# Patient Record
Sex: MALE | Race: BLACK OR AFRICAN AMERICAN | NOT HISPANIC OR LATINO | Employment: UNEMPLOYED | URBAN - METROPOLITAN AREA
[De-identification: names, ages, dates, MRNs, and addresses within clinical notes are randomized per-mention and may not be internally consistent; named-entity substitution may affect disease eponyms.]

---
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
## 2021-07-18 ENCOUNTER — HOSPITAL ENCOUNTER (EMERGENCY)
Facility: OTHER | Age: 21
Discharge: HOME | End: 2021-07-18
Attending: EMERGENCY MEDICINE

## 2021-07-18 VITALS
TEMPERATURE: 98 F | DIASTOLIC BLOOD PRESSURE: 76 MMHG | SYSTOLIC BLOOD PRESSURE: 124 MMHG | RESPIRATION RATE: 18 BRPM | HEIGHT: 72 IN | OXYGEN SATURATION: 99 % | BODY MASS INDEX: 20.32 KG/M2 | HEART RATE: 76 BPM | WEIGHT: 150 LBS

## 2021-07-18 DIAGNOSIS — U07.1 COVID-19 VIRUS INFECTION: Primary | ICD-10-CM

## 2021-07-18 LAB
CTP QC/QA: YES
SARS-COV-2 RDRP RESP QL NAA+PROBE: POSITIVE

## 2021-07-18 PROCEDURE — U0002 COVID-19 LAB TEST NON-CDC: HCPCS | Performed by: EMERGENCY MEDICINE

## 2021-07-18 PROCEDURE — 99284 EMERGENCY DEPT VISIT MOD MDM: CPT

## 2021-07-18 RX ORDER — BENZONATATE 100 MG/1
100 CAPSULE ORAL 3 TIMES DAILY PRN
Qty: 30 CAPSULE | Refills: 0 | Status: SHIPPED | OUTPATIENT
Start: 2021-07-18 | End: 2021-07-28

## 2021-07-18 RX ORDER — ONDANSETRON 4 MG/1
4 TABLET, ORALLY DISINTEGRATING ORAL EVERY 6 HOURS PRN
Qty: 20 TABLET | Refills: 0 | Status: SHIPPED | OUTPATIENT
Start: 2021-07-18 | End: 2021-12-28 | Stop reason: ALTCHOICE

## 2021-07-18 RX ORDER — DICYCLOMINE HYDROCHLORIDE 20 MG/1
20 TABLET ORAL 4 TIMES DAILY
Qty: 20 TABLET | Refills: 0 | Status: SHIPPED | OUTPATIENT
Start: 2021-07-18 | End: 2021-08-07

## 2021-07-19 DIAGNOSIS — U07.1 COVID-19 VIRUS DETECTED: ICD-10-CM

## 2021-07-22 ENCOUNTER — NURSE TRIAGE (OUTPATIENT)
Dept: ADMINISTRATIVE | Facility: CLINIC | Age: 21
End: 2021-07-22

## 2021-08-17 ENCOUNTER — HOSPITAL ENCOUNTER (EMERGENCY)
Facility: OTHER | Age: 21
Discharge: HOME | End: 2021-08-17
Attending: EMERGENCY MEDICINE

## 2021-08-17 VITALS
WEIGHT: 140 LBS | OXYGEN SATURATION: 98 % | DIASTOLIC BLOOD PRESSURE: 82 MMHG | HEART RATE: 71 BPM | RESPIRATION RATE: 16 BRPM | SYSTOLIC BLOOD PRESSURE: 125 MMHG | BODY MASS INDEX: 19.6 KG/M2 | TEMPERATURE: 98 F | HEIGHT: 71 IN

## 2021-08-17 DIAGNOSIS — Z20.822 ENCOUNTER FOR LABORATORY TESTING FOR COVID-19 VIRUS: Primary | ICD-10-CM

## 2021-08-17 LAB
CTP QC/QA: YES
SARS-COV-2 RDRP RESP QL NAA+PROBE: NEGATIVE

## 2021-08-17 PROCEDURE — U0002 COVID-19 LAB TEST NON-CDC: HCPCS | Performed by: EMERGENCY MEDICINE

## 2021-08-17 PROCEDURE — 99282 EMERGENCY DEPT VISIT SF MDM: CPT

## 2021-08-27 ENCOUNTER — HOSPITAL ENCOUNTER (EMERGENCY)
Facility: OTHER | Age: 21
Discharge: HOME | End: 2021-08-27
Attending: EMERGENCY MEDICINE

## 2021-08-27 VITALS
OXYGEN SATURATION: 96 % | DIASTOLIC BLOOD PRESSURE: 62 MMHG | RESPIRATION RATE: 14 BRPM | SYSTOLIC BLOOD PRESSURE: 107 MMHG | TEMPERATURE: 98 F | HEART RATE: 68 BPM

## 2021-08-27 DIAGNOSIS — R06.09 DOE (DYSPNEA ON EXERTION): Primary | ICD-10-CM

## 2021-08-27 DIAGNOSIS — R07.89 CHEST TIGHTNESS: ICD-10-CM

## 2021-08-27 DIAGNOSIS — Z20.2 POSSIBLE EXPOSURE TO STD: ICD-10-CM

## 2021-08-27 LAB
ALBUMIN SERPL BCP-MCNC: 4.3 G/DL (ref 3.5–5.2)
ALP SERPL-CCNC: 84 U/L (ref 55–135)
ALT SERPL W/O P-5'-P-CCNC: 25 U/L (ref 10–44)
ANION GAP SERPL CALC-SCNC: 9 MMOL/L (ref 8–16)
AST SERPL-CCNC: 23 U/L (ref 10–40)
BASOPHILS # BLD AUTO: 0.05 K/UL (ref 0–0.2)
BASOPHILS NFR BLD: 1 % (ref 0–1.9)
BILIRUB SERPL-MCNC: 0.5 MG/DL (ref 0.1–1)
BILIRUB UR QL STRIP: NEGATIVE
BUN SERPL-MCNC: 15 MG/DL (ref 6–20)
C TRACH DNA SPEC QL NAA+PROBE: NOT DETECTED
CALCIUM SERPL-MCNC: 9.1 MG/DL (ref 8.7–10.5)
CHLORIDE SERPL-SCNC: 106 MMOL/L (ref 95–110)
CLARITY UR: CLEAR
CO2 SERPL-SCNC: 24 MMOL/L (ref 23–29)
COLOR UR: YELLOW
CREAT SERPL-MCNC: 1 MG/DL (ref 0.5–1.4)
CRP SERPL-MCNC: 0.9 MG/L (ref 0–8.2)
D DIMER PPP IA.FEU-MCNC: <0.19 MG/L FEU
DIFFERENTIAL METHOD: ABNORMAL
EOSINOPHIL # BLD AUTO: 0.2 K/UL (ref 0–0.5)
EOSINOPHIL NFR BLD: 3.1 % (ref 0–8)
ERYTHROCYTE [DISTWIDTH] IN BLOOD BY AUTOMATED COUNT: 12.5 % (ref 11.5–14.5)
EST. GFR  (AFRICAN AMERICAN): >60 ML/MIN/1.73 M^2
EST. GFR  (NON AFRICAN AMERICAN): >60 ML/MIN/1.73 M^2
GLUCOSE SERPL-MCNC: 80 MG/DL (ref 70–110)
GLUCOSE UR QL STRIP: NEGATIVE
HCT VFR BLD AUTO: 40.7 % (ref 40–54)
HCV AB SERPL QL IA: NEGATIVE
HGB BLD-MCNC: 13.8 G/DL (ref 14–18)
HGB UR QL STRIP: NEGATIVE
HIV 1+2 AB+HIV1 P24 AG SERPL QL IA: NEGATIVE
IMM GRANULOCYTES # BLD AUTO: 0 K/UL (ref 0–0.04)
IMM GRANULOCYTES NFR BLD AUTO: 0 % (ref 0–0.5)
KETONES UR QL STRIP: NEGATIVE
LEUKOCYTE ESTERASE UR QL STRIP: NEGATIVE
LYMPHOCYTES # BLD AUTO: 1.8 K/UL (ref 1–4.8)
LYMPHOCYTES NFR BLD: 35 % (ref 18–48)
MCH RBC QN AUTO: 28.8 PG (ref 27–31)
MCHC RBC AUTO-ENTMCNC: 33.9 G/DL (ref 32–36)
MCV RBC AUTO: 85 FL (ref 82–98)
MONOCYTES # BLD AUTO: 0.7 K/UL (ref 0.3–1)
MONOCYTES NFR BLD: 12.9 % (ref 4–15)
N GONORRHOEA DNA SPEC QL NAA+PROBE: NOT DETECTED
NEUTROPHILS # BLD AUTO: 2.5 K/UL (ref 1.8–7.7)
NEUTROPHILS NFR BLD: 48 % (ref 38–73)
NITRITE UR QL STRIP: NEGATIVE
NRBC BLD-RTO: 0 /100 WBC
PH UR STRIP: 6 [PH] (ref 5–8)
PLATELET # BLD AUTO: 208 K/UL (ref 150–450)
PMV BLD AUTO: 10.1 FL (ref 9.2–12.9)
POTASSIUM SERPL-SCNC: 3.8 MMOL/L (ref 3.5–5.1)
PROT SERPL-MCNC: 7.4 G/DL (ref 6–8.4)
PROT UR QL STRIP: NEGATIVE
RBC # BLD AUTO: 4.79 M/UL (ref 4.6–6.2)
SODIUM SERPL-SCNC: 139 MMOL/L (ref 136–145)
SP GR UR STRIP: 1.01 (ref 1–1.03)
TROPONIN I SERPL DL<=0.01 NG/ML-MCNC: <0.006 NG/ML (ref 0–0.03)
URN SPEC COLLECT METH UR: NORMAL
UROBILINOGEN UR STRIP-ACNC: NEGATIVE EU/DL
WBC # BLD AUTO: 5.2 K/UL (ref 3.9–12.7)

## 2021-08-27 PROCEDURE — 93010 ELECTROCARDIOGRAM REPORT: CPT | Mod: ,,, | Performed by: INTERNAL MEDICINE

## 2021-08-27 PROCEDURE — 86803 HEPATITIS C AB TEST: CPT | Performed by: EMERGENCY MEDICINE

## 2021-08-27 PROCEDURE — 85379 FIBRIN DEGRADATION QUANT: CPT | Performed by: EMERGENCY MEDICINE

## 2021-08-27 PROCEDURE — 81003 URINALYSIS AUTO W/O SCOPE: CPT | Performed by: NURSE PRACTITIONER

## 2021-08-27 PROCEDURE — 84484 ASSAY OF TROPONIN QUANT: CPT | Performed by: EMERGENCY MEDICINE

## 2021-08-27 PROCEDURE — 96374 THER/PROPH/DIAG INJ IV PUSH: CPT

## 2021-08-27 PROCEDURE — 86140 C-REACTIVE PROTEIN: CPT | Performed by: EMERGENCY MEDICINE

## 2021-08-27 PROCEDURE — 93010 EKG 12-LEAD: ICD-10-PCS | Mod: ,,, | Performed by: INTERNAL MEDICINE

## 2021-08-27 PROCEDURE — 85025 COMPLETE CBC W/AUTO DIFF WBC: CPT | Performed by: EMERGENCY MEDICINE

## 2021-08-27 PROCEDURE — 93005 ELECTROCARDIOGRAM TRACING: CPT

## 2021-08-27 PROCEDURE — 63600175 PHARM REV CODE 636 W HCPCS: Performed by: EMERGENCY MEDICINE

## 2021-08-27 PROCEDURE — 99285 EMERGENCY DEPT VISIT HI MDM: CPT | Mod: 25

## 2021-08-27 PROCEDURE — 87591 N.GONORRHOEAE DNA AMP PROB: CPT | Performed by: NURSE PRACTITIONER

## 2021-08-27 PROCEDURE — 63700000 PHARM REV CODE 250 ALT 637 W/O HCPCS: Performed by: EMERGENCY MEDICINE

## 2021-08-27 PROCEDURE — 87491 CHLMYD TRACH DNA AMP PROBE: CPT | Performed by: NURSE PRACTITIONER

## 2021-08-27 PROCEDURE — 80053 COMPREHEN METABOLIC PANEL: CPT | Performed by: EMERGENCY MEDICINE

## 2021-08-27 PROCEDURE — 87389 HIV-1 AG W/HIV-1&-2 AB AG IA: CPT | Performed by: EMERGENCY MEDICINE

## 2021-08-27 RX ORDER — CEFTRIAXONE 500 MG/1
500 INJECTION, POWDER, FOR SOLUTION INTRAMUSCULAR; INTRAVENOUS
Status: COMPLETED | OUTPATIENT
Start: 2021-08-27 | End: 2021-08-27

## 2021-08-27 RX ORDER — AZITHROMYCIN 250 MG/1
1000 TABLET, FILM COATED ORAL
Status: COMPLETED | OUTPATIENT
Start: 2021-08-27 | End: 2021-08-27

## 2021-08-27 RX ADMIN — AZITHROMYCIN MONOHYDRATE 1000 MG: 250 TABLET ORAL at 07:08

## 2021-08-27 RX ADMIN — CEFTRIAXONE SODIUM 500 MG: 500 INJECTION, POWDER, FOR SOLUTION INTRAMUSCULAR; INTRAVENOUS at 07:08

## 2021-12-28 ENCOUNTER — HOSPITAL ENCOUNTER (EMERGENCY)
Facility: OTHER | Age: 21
Discharge: HOME | End: 2021-12-29
Attending: EMERGENCY MEDICINE

## 2021-12-28 VITALS
SYSTOLIC BLOOD PRESSURE: 131 MMHG | RESPIRATION RATE: 18 BRPM | DIASTOLIC BLOOD PRESSURE: 74 MMHG | HEART RATE: 86 BPM | BODY MASS INDEX: 18.2 KG/M2 | OXYGEN SATURATION: 96 % | WEIGHT: 130 LBS | HEIGHT: 71 IN | TEMPERATURE: 100 F

## 2021-12-28 DIAGNOSIS — U07.1 COVID-19 VIRUS INFECTION: Primary | ICD-10-CM

## 2021-12-28 LAB
CTP QC/QA: YES
SARS-COV-2 RDRP RESP QL NAA+PROBE: POSITIVE

## 2021-12-28 PROCEDURE — 99284 EMERGENCY DEPT VISIT MOD MDM: CPT | Mod: 25

## 2021-12-28 PROCEDURE — U0002 COVID-19 LAB TEST NON-CDC: HCPCS | Performed by: EMERGENCY MEDICINE

## 2021-12-28 RX ORDER — METOCLOPRAMIDE 10 MG/1
10 TABLET ORAL EVERY 6 HOURS PRN
Qty: 10 TABLET | Refills: 0 | Status: SHIPPED | OUTPATIENT
Start: 2021-12-28

## 2021-12-28 RX ORDER — NAPROXEN 500 MG/1
500 TABLET ORAL 2 TIMES DAILY PRN
Qty: 60 TABLET | Refills: 0 | Status: SHIPPED | OUTPATIENT
Start: 2021-12-28

## 2021-12-28 RX ORDER — ONDANSETRON 4 MG/1
4 TABLET, ORALLY DISINTEGRATING ORAL EVERY 6 HOURS PRN
Qty: 12 TABLET | Refills: 0 | Status: SHIPPED | OUTPATIENT
Start: 2021-12-28

## 2021-12-28 RX ORDER — BENZONATATE 100 MG/1
100 CAPSULE ORAL 3 TIMES DAILY PRN
Qty: 20 CAPSULE | Refills: 0 | Status: SHIPPED | OUTPATIENT
Start: 2021-12-28 | End: 2022-01-07

## 2021-12-28 RX ORDER — ACETAMINOPHEN 325 MG/1
650 TABLET ORAL EVERY 6 HOURS PRN
Qty: 30 TABLET | Refills: 0 | Status: SHIPPED | OUTPATIENT
Start: 2021-12-28

## 2021-12-29 NOTE — DISCHARGE INSTRUCTIONS
"This a Doctor's Order and Doctor's Excuse Note.   Your COVID19 test was positive. Your symptoms are due to COVID19. Y  Isolate at home for 5 days.   Stay home except to get medical care.   Do not leave home. Do not go to work, school, stores, or other public places.   Do not go to see anyone you do not live with.   Separate yourself from other people and animals in your home. Wear a mask when around others you live with. Use a separate bathroom if possible.   If you have no symptoms or your symptoms are resolving on day 6 days, you can leave your house.  Continue to strictly wear a mask around others for 5 additional days.  If you have a fever, continue to stay home until your fever resolves.    Staying away from others will the save lives of people you know and love.   Avoid sharing personal household items.   Clean all "high touch" surfaces every day.   Monitor your symptoms carefully. If your symptoms get worse, call your healthcare provider immediately, or 211, or [facility] nurse line [phone]?or?[phone].    Notify your close contacts  a close contact is anyone who you were within 6 feet for a combined total of 15 minutes or more over a 24-hour period, whether or not you were wearing a mask.   Let them know you have COVID19 so that they can quarantine at home and get tested.     Wash your hands often with soap and water or alcohol-based hand . Make sure you are covering all surfaces of your hands and rubbing them together until they feel dry. This takes at least 20 seconds.   Get rest and stay hydrated. Take tylenol (acetaminophen) or ibuprofen (motrin, advil) for aches, pains, and fever. Take decongestants for congestion.  For meal, rent, and other assistance call 311 ([phone]) or go to [place].gov/311   Check www.cdc.gov/coronavirus for latest official information      Contact Tracing: As one of the next steps, you will receive a call or text from [place] Department of Health COVID19 " "contract tracing team. They will call from [phone] with Caller ID "[facility]." It is important that your respond to them or call them.   Discussions with health department staff are confidential. This means that your personal and medical information will be kept private and only shared with those who may need to know, like your health care provider.  Your name will not be shared with those you came in contact with. The health department will only notify people you were in close contact with that they might have been exposed to COVID-19.  Your information will be collected for health purposes only and will not be shared with any other agencies, like law enforcement or immigration.      "

## 2021-12-30 NOTE — ED PROVIDER NOTES
"  Source of History:  Medical record, patient    Chief complaint:  Per triage note: "COVID-19 Concerns (Chills, body aches, fatigue, loss of taste and smell)  "    HPI:    Dilan Meier is a 21 y.o. male who presents with fevers, chills, malaise for the last day. No clear inciting factor. No known sick contacts.       Patient unvaccinated against COVID19.  This is the extent of the patient's complaints at this time.     ROS:   As per HPI and below:   General: Notes fever, malaise.   HENT: No facial pain.    Eyes: No eye pain.   Cardiovascular: No chest pain.   Respiratory:  No dyspnea.   GI: No abdominal pain.   Skin: No rashes.   Neuro:  No syncope.  No focal deficits.   Musculoskeletal: No myalgias.  All other systems reviewed and are negative.      Review of patient's allergies indicates:  No Known Allergies    PMH:  As per HPI and below:  Past Medical History:   Diagnosis Date    Broken ankle        No past surgical history on file.    Social History     Tobacco Use    Smoking status: Never Smoker   Substance Use Topics    Alcohol use: Not Currently    Drug use: Yes     Types: Marijuana       Physical Exam:      Nursing note and vitals reviewed.    /74   Pulse 86   Temp 99.7 °F (37.6 °C) (Oral)   Resp 18   Ht 5' 11" (1.803 m)   Wt 59 kg (130 lb)   SpO2 96%   BMI 18.13 kg/m²   Constitutional: AAOx3. No distress.   Eyes: EOMI. No discharge. Anicteric.  HENT:   Neck: Normal range of motion. Neck supple.  Cardiovascular: Normal rate.  Regular rhythm.    Pulmonary/Chest: No respiratory distress. Effort normal.  No tachypnea.  Abdominal: No distension and no mass.   Musculoskeletal: Normal range of motion.   Neurological: GCS 15. Alert and oriented to person, place, and time. No gross cranial nerve, light touch or strength deficit. Coordination normal.   Skin: Skin is warm and dry.   EXT: 2+ radial pulses.   Psychiatric: Behavior is normal. Judgment normal.        MDM:    I decided to obtain the " patient's medical records.  Pt is a 21 y.o. with pertinent medical history who presents with symptoms consistent with COVID-19 infection.  On exam, patient well-appearing.  Initial differential included COVID 19 infection, influenza, other acute viral syndrome.  Low suspicion for severe metabolic derangement, sepsis, organ failure including acute respiratory failure.  I independently reviewed and interpreted labs which are notable for positive point-of-care COVID-19 PCR.  Patient had no hypoxia with ambulation.   Patient does not meet current admission/hospital observation criteria.     Patient instructed to self isolate in a room in their home away from others they live with, advised not to leave home for any reason, and given community resources to assist in this. Pt instructed to do so until at least 10 days after onset, and symptoms resolved x24 hrs, and no antipyretics x24 hrs per CDC and health dept recommendations. Pt has access to masks.     COVID-19 antiviral treatment  paxlovid/nirmatrelvir has received EUA, however is not yet available. Risks and benefits of monoclonal antibody treatment were considered, however there are indications that the modest, if any benefit, with ancestral strains is diminished with omicron variant.     Patient instructed on notification of recent contacts, importance of participating in contact tracing, maximal supportive care, to check temperature at least daily, strict return instructions particularly respiratory distress and decompensation.    I ordered pulse oximeter for home use to be given prior to discharge.  Patient counseled to obtain COVID vaccination as early as 2 weeks after symptom resolution pending further CDC guidance on post infection vaccination recommendations.  Patient verbalized understanding of plan, strict return precautions. All questions answered.   --  I discussed with patient and/or guardian/caretaker that this evaluation in the ED does not suggest any  emergent or life threatening medical condition requiring admission or further immediate intervention or diagnostics. Regardless, an unremarkable evaluation in the ED does not preclude the development or presence of a serious or life threatening condition. As such, patient was instructed to return for any worsening, new, changed, or concerning symptoms.     I had a detailed discussion with patient  and/or guardian/caretaker regarding findings, plan, return precautions, importance of medication adherence, need for appropriate follow-up with a PCP.     Management decisions for this encounter made during acute wave of the COVID-19 public health emergency which has severely strained healthcare and community resources. Available resources, standards for appropriate emergency department evaluation, and admission vs. discharge standards have necessarily shifted and remain dynamic.     Note was created using voice recognition software. It may have occasional typographical errors not identified and edited despite initial review prior to signing.           Medications - No data to display         Procedures        Diagnostic Impression:    1. COVID-19 virus infection         ED Disposition Condition    Discharge Good        ED Prescriptions     Medication Sig Dispense Start Date End Date Auth. Provider    naproxen (NAPROSYN) 500 MG tablet Take 1 tablet (500 mg total) by mouth 2 (two) times daily as needed (pain). 60 tablet 12/28/2021  Kris Junior MD    acetaminophen (TYLENOL) 325 MG tablet Take 2 tablets (650 mg total) by mouth every 6 (six) hours as needed for Pain or Temperature greater than (100.3). 30 tablet 12/28/2021  Kris Junior MD    ondansetron (ZOFRAN-ODT) 4 MG TbDL Take 1 tablet (4 mg total) by mouth every 6 (six) hours as needed (nausea or vomiting). 12 tablet 12/28/2021  Kris Junior MD    benzonatate (TESSALON) 100 MG capsule Take 1 capsule (100 mg total) by mouth 3 (three) times daily as needed for  Cough. 20 capsule 12/28/2021 1/7/2022 Kris Junior MD    metoclopramide HCl (REGLAN) 10 MG tablet Take 1 tablet (10 mg total) by mouth every 6 (six) hours as needed (headache, nausea or vomiting). 10 tablet 12/28/2021  Kris Junior MD        Follow-up Information     Follow up With Specialties Details Why Contact Info    Your primary care doctor  Call in 2 days For recheck with your primary care doctor     [facility]  Call in 1 day To start seeing a primary care doctor, if you do not have one [address] [facility] [address]  [phone]             Kris Junior MD  12/29/21 0204

## 2022-03-18 ENCOUNTER — HOSPITAL ENCOUNTER (EMERGENCY)
Facility: OTHER | Age: 22
Discharge: LEFT BEFORE DISCHARGE | End: 2022-03-18

## 2022-03-18 VITALS
HEIGHT: 71 IN | HEART RATE: 85 BPM | RESPIRATION RATE: 16 BRPM | OXYGEN SATURATION: 100 % | BODY MASS INDEX: 20.3 KG/M2 | TEMPERATURE: 99 F | SYSTOLIC BLOOD PRESSURE: 133 MMHG | WEIGHT: 145 LBS | DIASTOLIC BLOOD PRESSURE: 88 MMHG

## 2022-03-18 PROCEDURE — 99900041 HC LEFT WITHOUT BEING SEEN- EMERGENCY

## 2023-11-25 PROBLEM — J10.1 INFLUENZA A: Status: ACTIVE | Noted: 2023-11-25

## 2024-01-12 ENCOUNTER — HOSPITAL ENCOUNTER (EMERGENCY)
Facility: HOSPITAL | Age: 24
Discharge: HOME | End: 2024-01-12
Attending: STUDENT IN AN ORGANIZED HEALTH CARE EDUCATION/TRAINING PROGRAM

## 2024-01-12 VITALS
RESPIRATION RATE: 18 BRPM | BODY MASS INDEX: 20.3 KG/M2 | SYSTOLIC BLOOD PRESSURE: 141 MMHG | HEIGHT: 71 IN | HEART RATE: 86 BPM | OXYGEN SATURATION: 100 % | DIASTOLIC BLOOD PRESSURE: 91 MMHG | TEMPERATURE: 100 F | WEIGHT: 145 LBS

## 2024-01-12 DIAGNOSIS — R07.9 CHEST PAIN: ICD-10-CM

## 2024-01-12 DIAGNOSIS — U07.1 COVID-19: Primary | ICD-10-CM

## 2024-01-12 LAB
INFLUENZA A, MOLECULAR: NEGATIVE
INFLUENZA B, MOLECULAR: NEGATIVE
SARS-COV-2 RDRP RESP QL NAA+PROBE: POSITIVE
SPECIMEN SOURCE: NORMAL

## 2024-01-12 PROCEDURE — 93005 ELECTROCARDIOGRAM TRACING: CPT

## 2024-01-12 PROCEDURE — 25000003 PHARM REV CODE 250: Performed by: STUDENT IN AN ORGANIZED HEALTH CARE EDUCATION/TRAINING PROGRAM

## 2024-01-12 PROCEDURE — 99283 EMERGENCY DEPT VISIT LOW MDM: CPT

## 2024-01-12 PROCEDURE — 93010 ELECTROCARDIOGRAM REPORT: CPT | Mod: ,,, | Performed by: INTERNAL MEDICINE

## 2024-01-12 PROCEDURE — 87502 INFLUENZA DNA AMP PROBE: CPT | Performed by: EMERGENCY MEDICINE

## 2024-01-12 PROCEDURE — U0002 COVID-19 LAB TEST NON-CDC: HCPCS | Performed by: EMERGENCY MEDICINE

## 2024-01-12 RX ORDER — ACETAMINOPHEN 500 MG
1000 TABLET ORAL
Status: COMPLETED | OUTPATIENT
Start: 2024-01-12 | End: 2024-01-12

## 2024-01-12 RX ORDER — IBUPROFEN 800 MG/1
800 TABLET ORAL EVERY 6 HOURS PRN
Qty: 20 TABLET | Refills: 0 | OUTPATIENT
Start: 2024-01-12 | End: 2024-05-24

## 2024-01-12 RX ADMIN — ACETAMINOPHEN 1000 MG: 500 TABLET ORAL at 02:01

## 2024-01-12 NOTE — DISCHARGE INSTRUCTIONS
Your COVID testing today came back positive.  Please quarantine per CDC guidelines.    Use Tylenol (500-650 mg) and ibuprofen, alternating every 4 hours as needed for headache, body ache, fever, and/or chills.      Use Afrin or similar generics for nasal congestion.    Use cough syrup, lozenges, honey as needed for cough or sore throat.

## 2024-01-12 NOTE — Clinical Note
"Dilan Weaverkeila Meier was seen and treated in our emergency department on 1/12/2024.  He may return to work on 01/16/2024.       If you have any questions or concerns, please don't hesitate to call.      Kevon Mcbride MD"

## 2024-01-12 NOTE — ED NOTES
Patient reports two days of a cough, chest pain that increases with deep breathing and aching to body.  Chest pain not present at this time.     Pain:  Rated 0/10.     Psychosocial:  Patient is calm and cooperative.  Patients insight and judgement are appropriate to situation.  Appears clean, well maintained, with clothing appropriate to environment.  No evidence of delusions, hallucinations, or psychosis.     Neuro:  Eyes open spontaneously.  Awake, alert, oriented x 4.  Speech clear and appropriate.  Tolerating saliva secretions well.  Able to follow commands, demonstrating ability to actively and appropriately communicate within context of current conversation.  Symmetrical facial muscles.  Moving all extremities well with no noted weakness.  Adequate muscle tone present.    Movement is purposeful.         Airway:  Bilateral chest rise and fall.  RR regular and non-labored.  Air entry patent and clear x 5 lobes of the lungs.  No crepitus or subcutaneous emphysema noted on palpation.       Circulatory:  Skin warm, dry, and pink.       Extremities:  No redness, heat, swelling, deformity, or pain.  Ambulates with steady gait.     Skin:  Intact with no bruising/discolorations noted.
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
## 2024-05-24 ENCOUNTER — HOSPITAL ENCOUNTER (EMERGENCY)
Facility: OTHER | Age: 24
Discharge: HOME | End: 2024-05-24
Attending: EMERGENCY MEDICINE
Payer: MEDICAID

## 2024-05-24 VITALS
SYSTOLIC BLOOD PRESSURE: 129 MMHG | DIASTOLIC BLOOD PRESSURE: 85 MMHG | HEART RATE: 78 BPM | TEMPERATURE: 98 F | RESPIRATION RATE: 18 BRPM | BODY MASS INDEX: 18.9 KG/M2 | HEIGHT: 71 IN | WEIGHT: 135 LBS | OXYGEN SATURATION: 98 %
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
## 2024-05-24 DIAGNOSIS — S91.331A GUNSHOT WOUND OF RIGHT FOOT, INITIAL ENCOUNTER: Primary | ICD-10-CM

## 2024-05-24 PROCEDURE — 63600175 PHARM REV CODE 636 W HCPCS: Performed by: NURSE PRACTITIONER

## 2024-05-24 PROCEDURE — 96365 THER/PROPH/DIAG IV INF INIT: CPT

## 2024-05-24 PROCEDURE — 99284 EMERGENCY DEPT VISIT MOD MDM: CPT | Mod: 25

## 2024-05-24 PROCEDURE — 29515 APPLICATION SHORT LEG SPLINT: CPT | Mod: RT

## 2024-05-24 PROCEDURE — 25000003 PHARM REV CODE 250: Performed by: NURSE PRACTITIONER

## 2024-05-24 RX ORDER — HYDROCODONE BITARTRATE AND ACETAMINOPHEN 5; 325 MG/1; MG/1
1 TABLET ORAL
Status: COMPLETED | OUTPATIENT
Start: 2024-05-24 | End: 2024-05-24

## 2024-05-24 RX ORDER — DEXTROMETHORPHAN HYDROBROMIDE, GUAIFENESIN 5; 100 MG/5ML; MG/5ML
650 LIQUID ORAL EVERY 8 HOURS PRN
Qty: 20 TABLET | Refills: 0 | Status: SHIPPED | OUTPATIENT
Start: 2024-05-24

## 2024-05-24 RX ORDER — HYDROCODONE BITARTRATE AND ACETAMINOPHEN 5; 325 MG/1; MG/1
1 TABLET ORAL EVERY 4 HOURS PRN
Qty: 15 TABLET | Refills: 0 | Status: SHIPPED | OUTPATIENT
Start: 2024-05-24 | End: 2024-06-09

## 2024-05-24 RX ORDER — MUPIROCIN 20 MG/G
1 OINTMENT TOPICAL
Status: COMPLETED | OUTPATIENT
Start: 2024-05-24 | End: 2024-05-24

## 2024-05-24 RX ORDER — IBUPROFEN 600 MG/1
600 TABLET ORAL
Status: COMPLETED | OUTPATIENT
Start: 2024-05-24 | End: 2024-05-24

## 2024-05-24 RX ORDER — ACETAMINOPHEN 325 MG/1
650 TABLET ORAL
Status: COMPLETED | OUTPATIENT
Start: 2024-05-24 | End: 2024-05-24

## 2024-05-24 RX ORDER — CEPHALEXIN 500 MG/1
500 CAPSULE ORAL 4 TIMES DAILY
Qty: 40 CAPSULE | Refills: 0 | Status: SHIPPED | OUTPATIENT
Start: 2024-05-24 | End: 2024-06-03

## 2024-05-24 RX ORDER — IBUPROFEN 600 MG/1
600 TABLET ORAL EVERY 6 HOURS PRN
Qty: 30 TABLET | Refills: 0 | Status: SHIPPED | OUTPATIENT
Start: 2024-05-24 | End: 2024-06-09

## 2024-05-24 RX ADMIN — ACETAMINOPHEN 650 MG: 325 TABLET, FILM COATED ORAL at 08:05

## 2024-05-24 RX ADMIN — CEFAZOLIN 2 G: 2 INJECTION, POWDER, FOR SOLUTION INTRAMUSCULAR; INTRAVENOUS at 08:05

## 2024-05-24 RX ADMIN — IBUPROFEN 600 MG: 600 TABLET, FILM COATED ORAL at 11:05

## 2024-05-24 RX ADMIN — HYDROCODONE BITARTRATE AND ACETAMINOPHEN 1 TABLET: 5; 325 TABLET ORAL at 11:05

## 2024-05-24 RX ADMIN — HYDROCODONE BITARTRATE AND ACETAMINOPHEN 1 TABLET: 5; 325 TABLET ORAL at 08:05

## 2024-05-24 RX ADMIN — MUPIROCIN 1 TUBE: 20 OINTMENT TOPICAL at 10:05
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
## 2024-05-25 NOTE — DISCHARGE INSTRUCTIONS
Podiatry will call you on Monday to make an appointment.  Keep the right foot elevated whenever you are sitting down and apply ice.  Please be nonweightbearing until you follow-up with Podiatry.  If you have bleeding that soaks 3 years splint, return to the ER.  Also if you have any change in color of your toes, sudden loss of feeling or constant numbness and tingling, return to the ER  I want she did take ibuprofen every 8 hours for your pain using the pain meds only when the ibuprofen and Tylenol are insufficient.
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
## 2024-05-27 ENCOUNTER — TELEPHONE (OUTPATIENT)
Dept: ORTHOPEDICS | Facility: CLINIC | Age: 24
End: 2024-05-27
Payer: MEDICAID

## 2024-05-27 ENCOUNTER — TELEPHONE (OUTPATIENT)
Dept: PODIATRY | Facility: CLINIC | Age: 24
End: 2024-05-27
Payer: MEDICAID

## 2024-06-03 ENCOUNTER — TELEPHONE (OUTPATIENT)
Dept: PODIATRY | Facility: CLINIC | Age: 24
End: 2024-06-03
Payer: MEDICAID

## 2024-06-09 ENCOUNTER — HOSPITAL ENCOUNTER (EMERGENCY)
Facility: OTHER | Age: 24
Discharge: HOME | End: 2024-06-09
Attending: EMERGENCY MEDICINE
Payer: MEDICAID

## 2024-06-09 VITALS
OXYGEN SATURATION: 99 % | HEART RATE: 80 BPM | SYSTOLIC BLOOD PRESSURE: 140 MMHG | BODY MASS INDEX: 18.9 KG/M2 | HEIGHT: 71 IN | TEMPERATURE: 98 F | DIASTOLIC BLOOD PRESSURE: 85 MMHG | WEIGHT: 135 LBS | RESPIRATION RATE: 18 BRPM
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
## 2024-06-09 DIAGNOSIS — S92.001D: ICD-10-CM

## 2024-06-09 DIAGNOSIS — R52 PAIN: ICD-10-CM

## 2024-06-09 DIAGNOSIS — S91.331A GUNSHOT WOUND OF RIGHT FOOT, INITIAL ENCOUNTER: ICD-10-CM

## 2024-06-09 DIAGNOSIS — S91.331D GUNSHOT WOUND OF RIGHT FOOT, SUBSEQUENT ENCOUNTER: Primary | ICD-10-CM

## 2024-06-09 DIAGNOSIS — S99.921A RIGHT FOOT INJURY: ICD-10-CM

## 2024-06-09 PROCEDURE — 99284 EMERGENCY DEPT VISIT MOD MDM: CPT | Mod: 25

## 2024-06-09 PROCEDURE — 29515 APPLICATION SHORT LEG SPLINT: CPT | Mod: RT

## 2024-06-09 PROCEDURE — 25000003 PHARM REV CODE 250: Performed by: EMERGENCY MEDICINE

## 2024-06-09 PROCEDURE — 63600175 PHARM REV CODE 636 W HCPCS: Performed by: EMERGENCY MEDICINE

## 2024-06-09 PROCEDURE — 96372 THER/PROPH/DIAG INJ SC/IM: CPT | Performed by: EMERGENCY MEDICINE

## 2024-06-09 RX ORDER — KETOROLAC TROMETHAMINE 30 MG/ML
60 INJECTION, SOLUTION INTRAMUSCULAR; INTRAVENOUS
Status: COMPLETED | OUTPATIENT
Start: 2024-06-09 | End: 2024-06-09

## 2024-06-09 RX ORDER — HYDROCODONE BITARTRATE AND ACETAMINOPHEN 5; 325 MG/1; MG/1
1 TABLET ORAL
Status: COMPLETED | OUTPATIENT
Start: 2024-06-09 | End: 2024-06-09

## 2024-06-09 RX ORDER — IBUPROFEN 600 MG/1
600 TABLET ORAL EVERY 6 HOURS PRN
Qty: 30 TABLET | Refills: 0 | Status: SHIPPED | OUTPATIENT
Start: 2024-06-09

## 2024-06-09 RX ORDER — HYDROCODONE BITARTRATE AND ACETAMINOPHEN 5; 325 MG/1; MG/1
1 TABLET ORAL EVERY 4 HOURS PRN
Qty: 18 TABLET | Refills: 0 | Status: SHIPPED | OUTPATIENT
Start: 2024-06-09

## 2024-06-09 RX ORDER — MUPIROCIN 20 MG/G
1 OINTMENT TOPICAL
Status: COMPLETED | OUTPATIENT
Start: 2024-06-09 | End: 2024-06-09

## 2024-06-09 RX ADMIN — MUPIROCIN 1 TUBE: 20 OINTMENT TOPICAL at 02:06

## 2024-06-09 RX ADMIN — HYDROCODONE BITARTRATE AND ACETAMINOPHEN 1 TABLET: 5; 325 TABLET ORAL at 02:06

## 2024-06-09 RX ADMIN — KETOROLAC TROMETHAMINE 60 MG: 30 INJECTION, SOLUTION INTRAMUSCULAR; INTRAVENOUS at 02:06
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
## 2024-06-10 ENCOUNTER — TELEPHONE (OUTPATIENT)
Dept: PODIATRY | Facility: CLINIC | Age: 24
End: 2024-06-10
Payer: MEDICAID

## 2024-06-10 ENCOUNTER — TELEPHONE (OUTPATIENT)
Dept: ORTHOPEDICS | Facility: CLINIC | Age: 24
End: 2024-06-10
Payer: MEDICAID

## 2024-06-10 NOTE — TELEPHONE ENCOUNTER
Spoke with patient mother of child, didn't give her name, in regards to scheduling an appointment with Podiatry, asked if pt. has been seen in Podiatry and she stated no he has not and also does not have insurance. I gave her the number ([phone]) to contact Financial Services to discuss other options for patient, she verbally acknowledged information given            ...Contact patient to advise I am unable to schedule you at this time, please call your insurance for a list of in-network Providers. - Contact Financial Services for follow up discussion at [phone].
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
## 2024-06-19 ENCOUNTER — OFFICE VISIT (OUTPATIENT)
Dept: PODIATRY | Facility: CLINIC | Age: 24
End: 2024-06-19
Attending: EMERGENCY MEDICINE
Payer: MEDICAID

## 2024-06-19 VITALS
HEIGHT: 71 IN | HEART RATE: 81 BPM | SYSTOLIC BLOOD PRESSURE: 145 MMHG | DIASTOLIC BLOOD PRESSURE: 90 MMHG | WEIGHT: 134.94 LBS | BODY MASS INDEX: 18.89 KG/M2

## 2024-06-19 DIAGNOSIS — S91.301A OPEN WOUND OF HEEL, RIGHT, INITIAL ENCOUNTER: Primary | ICD-10-CM

## 2024-06-19 DIAGNOSIS — S91.331D GUNSHOT WOUND OF RIGHT FOOT, SUBSEQUENT ENCOUNTER: ICD-10-CM

## 2024-06-19 PROCEDURE — 99203 OFFICE O/P NEW LOW 30 MIN: CPT | Mod: S$PBB,,, | Performed by: PODIATRIST

## 2024-06-19 PROCEDURE — 3077F SYST BP >= 140 MM HG: CPT | Mod: CPTII,,, | Performed by: PODIATRIST

## 2024-06-19 PROCEDURE — 99213 OFFICE O/P EST LOW 20 MIN: CPT | Mod: PBBFAC,PN | Performed by: PODIATRIST

## 2024-06-19 PROCEDURE — 3080F DIAST BP >= 90 MM HG: CPT | Mod: CPTII,,, | Performed by: PODIATRIST

## 2024-06-19 PROCEDURE — 3008F BODY MASS INDEX DOCD: CPT | Mod: CPTII,,, | Performed by: PODIATRIST

## 2024-06-19 PROCEDURE — 1159F MED LIST DOCD IN RCRD: CPT | Mod: CPTII,,, | Performed by: PODIATRIST

## 2024-06-19 PROCEDURE — 99999 PR PBB SHADOW E&M-EST. PATIENT-LVL III: CPT | Mod: PBBFAC,,, | Performed by: PODIATRIST
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
